# Patient Record
(demographics unavailable — no encounter records)

---

## 2024-10-07 NOTE — HISTORY OF PRESENT ILLNESS
[FreeTextEntry1] : Ms. CUBA HAAS is a 61-year-old female with a PMH of HIV positive (stable/undetectable on Bikatarvy).  PSH: LEEP procedure (2004).  Denies issues with anesthesia.   Patient presents for pre-colonoscopy evaluation. Self-referred for colonoscopy.  Last colonoscopy 5 years ago in [place] she said the office was affiliated with [facility], [place] but told to repeat in 5 years.  Prior CRC screening such as Cologuard, FIT test, CT colonography: No   Denies change in bowel habits, constipation, N/V/D, rectal bleeding, abdominal pain, bloating, unintentional weight loss, early satiety, dysphagia. Denies heartburn or reflux on a regular basis. Denies bloody or black stools. BM every 1-2 days; soft, brown stools that are easily passed.   Family history: Denies family history of colon cancer or advanced colorectal polyps. Denies family history of IBD or celiac disease.   Social: Smoking history: Current, 1 PPD x 20 years.  Alcohol consumption: None.  Marijuana use: None Other recreational drug use: None

## 2024-10-07 NOTE — ASSESSMENT
[FreeTextEntry1] : Colonoscopy with Dr. Wells.  - Indications: Screening - Reviewed importance of adequate colon cleansing prior to colonoscopy. Instructed to contact office if he/she has any questions regarding prep. - Explained the risks (including but not limited to cardiopulmonary anesthetic complications, bleeding, perforation, aspiration, missed lesions and misidentified sites of lesions - complications that might necessitate hospitalization or surgery), benefits and alternatives of colonoscopy were reviewed with the patient. Preparation for the procedure was reviewed with the patient. The patient was informed that she/he would be given intravenous anesthesia by an anesthesiologist for the procedure. The patient was informed that a family member or friend must drive the patient following recovery from the procedure. Patient understands and agrees, all questions answered. - Holds: None.  - Prep: Miralax prep (clenpiq interacts with her Biktarvy).